# Patient Record
Sex: MALE | Race: WHITE | Employment: OTHER | URBAN - METROPOLITAN AREA
[De-identification: names, ages, dates, MRNs, and addresses within clinical notes are randomized per-mention and may not be internally consistent; named-entity substitution may affect disease eponyms.]

---

## 2017-12-29 ENCOUNTER — HOSPITAL ENCOUNTER (OUTPATIENT)
Dept: LAB | Age: 62
Discharge: HOME | End: 2017-12-29

## 2017-12-29 PROCEDURE — 88305 TISSUE EXAM BY PATHOLOGIST: CPT | Performed by: INTERNAL MEDICINE

## 2020-06-03 PROCEDURE — 88305 TISSUE EXAM BY PATHOLOGIST: CPT

## 2020-06-04 ENCOUNTER — HOSPITAL ENCOUNTER (OUTPATIENT)
Dept: LAB | Age: 65
Discharge: HOME | End: 2020-06-04

## 2021-08-18 ENCOUNTER — ANESTHESIA EVENT (OUTPATIENT)
Dept: ENDOSCOPY | Age: 66
End: 2021-08-18
Payer: COMMERCIAL

## 2021-08-18 RX ORDER — SODIUM CHLORIDE, SODIUM LACTATE, POTASSIUM CHLORIDE, CALCIUM CHLORIDE 600; 310; 30; 20 MG/100ML; MG/100ML; MG/100ML; MG/100ML
100 INJECTION, SOLUTION INTRAVENOUS CONTINUOUS
Status: CANCELLED | OUTPATIENT
Start: 2021-08-18

## 2021-08-19 ENCOUNTER — APPOINTMENT (OUTPATIENT)
Dept: GENERAL RADIOLOGY | Age: 66
End: 2021-08-19
Attending: INTERNAL MEDICINE
Payer: COMMERCIAL

## 2021-08-19 ENCOUNTER — ANESTHESIA (OUTPATIENT)
Dept: ENDOSCOPY | Age: 66
End: 2021-08-19
Payer: COMMERCIAL

## 2021-08-19 ENCOUNTER — HOSPITAL ENCOUNTER (OUTPATIENT)
Age: 66
Setting detail: OUTPATIENT SURGERY
Discharge: HOME | End: 2021-08-19
Attending: INTERNAL MEDICINE | Admitting: INTERNAL MEDICINE
Payer: COMMERCIAL

## 2021-08-19 VITALS
HEART RATE: 58 BPM | BODY MASS INDEX: 27.77 KG/M2 | TEMPERATURE: 98 F | DIASTOLIC BLOOD PRESSURE: 87 MMHG | OXYGEN SATURATION: 100 % | SYSTOLIC BLOOD PRESSURE: 121 MMHG | HEIGHT: 72 IN | WEIGHT: 205 LBS | RESPIRATION RATE: 16 BRPM

## 2021-08-19 PROCEDURE — 74011000250 HC RX REV CODE- 250: Performed by: NURSE ANESTHETIST, CERTIFIED REGISTERED

## 2021-08-19 PROCEDURE — 76040000025: Performed by: INTERNAL MEDICINE

## 2021-08-19 PROCEDURE — 2709999900 HC NON-CHARGEABLE SUPPLY: Performed by: INTERNAL MEDICINE

## 2021-08-19 PROCEDURE — 88305 TISSUE EXAM BY PATHOLOGIST: CPT

## 2021-08-19 PROCEDURE — 76060000032 HC ANESTHESIA 0.5 TO 1 HR: Performed by: INTERNAL MEDICINE

## 2021-08-19 PROCEDURE — 74011250636 HC RX REV CODE- 250/636: Performed by: NURSE ANESTHETIST, CERTIFIED REGISTERED

## 2021-08-19 PROCEDURE — 77030021593 HC FCPS BIOP ENDOSC BSC -A: Performed by: INTERNAL MEDICINE

## 2021-08-19 PROCEDURE — 74011250636 HC RX REV CODE- 250/636: Performed by: ANESTHESIOLOGY

## 2021-08-19 RX ORDER — SODIUM CHLORIDE, SODIUM LACTATE, POTASSIUM CHLORIDE, CALCIUM CHLORIDE 600; 310; 30; 20 MG/100ML; MG/100ML; MG/100ML; MG/100ML
1000 INJECTION, SOLUTION INTRAVENOUS CONTINUOUS
Status: DISCONTINUED | OUTPATIENT
Start: 2021-08-19 | End: 2021-08-19 | Stop reason: HOSPADM

## 2021-08-19 RX ORDER — PROPOFOL 10 MG/ML
INJECTION, EMULSION INTRAVENOUS
Status: DISCONTINUED | OUTPATIENT
Start: 2021-08-19 | End: 2021-08-19 | Stop reason: HOSPADM

## 2021-08-19 RX ORDER — LIDOCAINE HYDROCHLORIDE 20 MG/ML
INJECTION, SOLUTION EPIDURAL; INFILTRATION; INTRACAUDAL; PERINEURAL AS NEEDED
Status: DISCONTINUED | OUTPATIENT
Start: 2021-08-19 | End: 2021-08-19 | Stop reason: HOSPADM

## 2021-08-19 RX ORDER — PROPOFOL 10 MG/ML
INJECTION, EMULSION INTRAVENOUS AS NEEDED
Status: DISCONTINUED | OUTPATIENT
Start: 2021-08-19 | End: 2021-08-19 | Stop reason: HOSPADM

## 2021-08-19 RX ADMIN — SODIUM CHLORIDE, SODIUM LACTATE, POTASSIUM CHLORIDE, AND CALCIUM CHLORIDE 1000 ML: 600; 310; 30; 20 INJECTION, SOLUTION INTRAVENOUS at 11:08

## 2021-08-19 RX ADMIN — PROPOFOL 200 MCG/KG/MIN: 10 INJECTION, EMULSION INTRAVENOUS at 12:01

## 2021-08-19 RX ADMIN — LIDOCAINE HYDROCHLORIDE 100 MG: 20 INJECTION, SOLUTION EPIDURAL; INFILTRATION; INTRACAUDAL; PERINEURAL at 12:01

## 2021-08-19 RX ADMIN — PROPOFOL 80 MG: 10 INJECTION, EMULSION INTRAVENOUS at 12:01

## 2021-08-19 NOTE — ANESTHESIA POSTPROCEDURE EVALUATION
Procedure(s):  COLONOSCOPY/BMI 30  COLON BIOPSY. total IV anesthesia    Anesthesia Post Evaluation      Multimodal analgesia: multimodal analgesia used between 6 hours prior to anesthesia start to PACU discharge  Patient location during evaluation: bedside  Patient participation: complete - patient participated  Level of consciousness: awake and responsive to light touch  Pain management: adequate  Airway patency: patent  Anesthetic complications: no  Cardiovascular status: acceptable, hemodynamically stable, blood pressure returned to baseline and stable  Respiratory status: acceptable, unassisted, spontaneous ventilation and nonlabored ventilation  Hydration status: acceptable        INITIAL Post-op Vital signs:   Vitals Value Taken Time   BP 97/69 08/19/21 1240   Temp 36.7 °C (98 °F) 08/19/21 1240   Pulse 53 08/19/21 1252   Resp 12 08/19/21 1240   SpO2 97 % 08/19/21 1252   Vitals shown include unvalidated device data.

## 2021-08-19 NOTE — PROCEDURES
COLONOSCOPY    DATE of PROCEDURE: 8/19/2021    INDICATION:  1. Crohn's disease    POSTPROCEDURE DIAGNOSIS:  1. S/p ileocecetomy   2. Stenosis of surgical anastomosis  3. Diverticulosis of colon    MEDICATION: MAC. Further details per anesthesia note. INSTRUMENT: [id number]    PROCEDURE: After obtaining informed consent, the patient was placed in the left lateral position and sedated. The endoscope was advanced to the surgical anastomosis. On withdrawal, the colon was carefully visualized in a 360 degree fashion. The patient was taken to the recovery area in stable condition. Good bowel prep. FINDINGS:  Rectum: normal  Sigmoid: Mild diverticulosis. Otherwise normal exam.  Descending Colon: Mild diverticulosis. Otherwise normal exam.  Transverse Colon: Mild diverticulosis. Otherwise normal exam.  Ascending Colon: Patient is s/p ileocecectomy. Surgical anastomosis is stenotic to such a degree it can not be traversed with a pediatric colonoscope. It is not tight enough to justify dilation. Limited views of angel-terminal ileum are negative for ileitis. Prior moderate to severe colitis at anastomosis site appears to have resolved. Random colon biopsies      Estimated blood loss: 0-minimal   Specimens obtained during procedure:   1. Right colon biopsies  2. Transverse colon biopsies  3. Left colon biopsies      PLAN:  1. Follow-up pathology  2. Repeat exam based on pathology  3.  Follow-up in office in 3 months with repeat CBC and Pamela Cartwright MD

## 2021-08-19 NOTE — H&P
Gastroenterology Outpatient History and Physical    Patient: Edil Laboy    Physician: Ye Balderrama MD    Vital Signs: Blood pressure (!) 163/87, pulse 61, temperature 98.2 °F (36.8 °C), resp. rate 16, height 6' (1.829 m), weight 93 kg (205 lb), SpO2 98 %. Allergies:    Allergies   Allergen Reactions    Adalimumab Other (comments)     crohns like symptoms    Insect Venom Swelling     Insect bites causes rash, swelling and cellulitis    Ustekinumab Other (comments)     Can take Stelara- it just give him seasonal allergy symptoms       Chief Complaint: Crohn's disease    History of Present Illness: As Above    Justification for Procedure: As Above    History:  Past Medical History:   Diagnosis Date    Arrhythmia 2020    PVC's when had a PE no problem since    Arthritis     hands     Basal cell carcinoma 04/2011    right face    Basal cell carcinoma (BCC) of back     BPH (benign prostatic hyperplasia)     Crohn's colitis ([address]     History of kidney stones     PUD (peptic ulcer disease) 1988    [facility]monary embolism [facility] [address] [address] 2020    had PVC's with PE    Thromboembolus ([address] 02/2020    LE      Past Surgical History:   Procedure Laterality Date    HX APPENDECTOMY      HX COLONOSCOPY      HX ENDOSCOPY  10/14/2020    HX HERNIA REPAIR  2010    Incisional hernia repair with mesh midline above and below umbilicus    HX KNEE ARTHROSCOPY Right 2016    HX TURP  11/14/2020    ID ABDOMEN SURGERY PROC UNLISTED  1988     Lower portion of small bowel  resection      Social History     Socioeconomic History    Marital status:      Spouse name: Not on file    Number of children: Not on file    Years of education: Not on file    Highest education level: Not on file   Tobacco Use    Smoking status: Never Smoker    Smokeless tobacco: Never Used   Vaping Use    Vaping Use: Never used   Substance and Sexual Activity    Alcohol use: Yes     Comment: occasional drink     Drug use: Never     Social Determinants of Health     Financial Resource Strain:     Difficulty of Paying Living Expenses:    Food Insecurity:     Worried About Running Out of Food in the Last Year:     [address] in the Last Year:    Transportation Needs:     Lack of Transportation (Medical):  Lack of Transportation (Non-Medical):    Physical Activity:     Days of Exercise per Week:     Minutes of Exercise per Session:    Stress:     Feeling of Stress :    Social Connections:     Frequency of Communication with Friends and Family:     Frequency of Social Gatherings with Friends and Family:     Attends [employer] Services:     Active Member of Clubs or Organizations:     Attends Club or Organization Meetings:     Marital Status:     No family history on file. Medications:   Prior to Admission medications    Medication Sig Start Date End Date Taking? Authorizing Provider   omeprazole (PRILOSEC) 20 mg capsule Take 20 mg by mouth daily. Yes Provider, Historical   ustekinumab (ustekinaumab) 90 mg/mL injection 90 mg by SubCUTAneous route. Provider, Historical   apixaban (ELIQUIS PO) Take 1.25 mg by mouth two (2) times a day. Provider, Historical   acetaminophen (Tylenol Extra Strength) 500 mg tablet Take  by mouth.     Provider, Historical       Physical Exam:         Heart: regular rate and rhythm, S1, S2 normal, no murmur, click, rub or gallop   Lungs: chest clear, no wheezing, rales, normal symmetric air entry, Heart exam - S1, S2 normal, no murmur, no gallop, rate regular   Abdominal: Bowel sounds are normal, Bowel sounds active, liver is not enlarged, spleen is not enlarged           Findings/Diagnosis: Crohn's disease    Colonosocpy        Signed:  Tesha Powell MD 8/19/2021

## 2021-08-19 NOTE — ANESTHESIA PREPROCEDURE EVALUATION
Relevant Problems   No relevant active problems       Anesthetic History   No history of anesthetic complications            Review of Systems / Medical History  Patient summary reviewed and pertinent labs reviewed    Pulmonary  Within defined limits                 Neuro/Psych   Within defined limits           Cardiovascular            Dysrhythmias       Exercise tolerance: >4 METS  Comments: H/o PE on [address]   GI/Hepatic/Renal           PUD     Endo/Other        Arthritis     Other Findings              Physical Exam    Airway  Mallampati: II  TM Distance: 4 - 6 cm  Neck ROM: normal range of motion   Mouth opening: Normal     Cardiovascular    Rhythm: regular  Rate: normal      Pertinent negatives: No murmur   Dental  No notable dental hx       Pulmonary  Breath sounds clear to auscultation               Abdominal         Other Findings            Anesthetic Plan    ASA: 2  Anesthesia type: total IV anesthesia          Induction: Intravenous  Anesthetic plan and risks discussed with: Patient

## 2021-08-19 NOTE — DISCHARGE INSTRUCTIONS
Gastrointestinal Colonoscopy/Flexible Sigmoidoscopy - Lower Exam Discharge Instructions  1. Call Dr. Moy Garrett at [phone] for any problems or questions. 2. Contact the doctors office for follow up appointment as directed  3. Medication may cause drowsiness for several hours, therefore:  · Do not drive or operate machinery for reminder of the day. · No alcohol today. · Do not make any important or legal decisions for 24 hours. · Do not sign any legal documents for 24 hours. 4. Ordinarily, you may resume regular diet and activity after exam unless otherwise specified by your physician. 5. Because of air put into your colon during exam, you may experience some abdominal distension, relieved by the passage of gas, for several hours. 6. Contact your physician if you have any of the following:  · Excessive amount of bleeding - large amount when having a bowel movement. Occasional specks of blood with bowel movement would not be unusual.  · Severe abdominal pain  · Fever or Chills  Any additional instructions:    1. Dr. Clint Ceron office will call with pathology results and follow up appointment   2.  Follow-up in office in 3 months with repeat CBC and CMP

## 2025-05-14 ENCOUNTER — OFFICE VISIT (OUTPATIENT)
Age: 70
End: 2025-05-14
Payer: COMMERCIAL

## 2025-05-14 VITALS
DIASTOLIC BLOOD PRESSURE: 90 MMHG | SYSTOLIC BLOOD PRESSURE: 138 MMHG | BODY MASS INDEX: 28.96 KG/M2 | HEIGHT: 72 IN | WEIGHT: 213.8 LBS | HEART RATE: 56 BPM

## 2025-05-14 DIAGNOSIS — I49.9 IRREGULAR HEART RATE: ICD-10-CM

## 2025-05-14 DIAGNOSIS — I25.119 CORONARY ARTERY DISEASE INVOLVING NATIVE CORONARY ARTERY OF NATIVE HEART WITH ANGINA PECTORIS: ICD-10-CM

## 2025-05-14 DIAGNOSIS — I47.29 NSVT (NONSUSTAINED VENTRICULAR TACHYCARDIA) (HCC): ICD-10-CM

## 2025-05-14 DIAGNOSIS — I20.89 ANGINA OF EFFORT: ICD-10-CM

## 2025-05-14 DIAGNOSIS — R07.89 CHEST DISCOMFORT: ICD-10-CM

## 2025-05-14 DIAGNOSIS — I25.119 ATHEROSCLEROSIS OF NATIVE CORONARY ARTERY OF NATIVE HEART WITH ANGINA PECTORIS: ICD-10-CM

## 2025-05-14 DIAGNOSIS — R07.89 CHEST DISCOMFORT: Primary | ICD-10-CM

## 2025-05-14 DIAGNOSIS — I71.21 ANEURYSM OF ASCENDING AORTA WITHOUT RUPTURE: ICD-10-CM

## 2025-05-14 DIAGNOSIS — I48.0 PAROXYSMAL ATRIAL FIBRILLATION (HCC): ICD-10-CM

## 2025-05-14 DIAGNOSIS — Z78.9 STATIN INTOLERANCE: ICD-10-CM

## 2025-05-14 LAB
ANION GAP SERPL CALC-SCNC: 9 MMOL/L (ref 7–16)
BASOPHILS # BLD: 0.07 K/UL (ref 0–0.2)
BASOPHILS NFR BLD: 1.1 % (ref 0–2)
BUN SERPL-MCNC: 12 MG/DL (ref 8–23)
CALCIUM SERPL-MCNC: 9.4 MG/DL (ref 8.8–10.2)
CHLORIDE SERPL-SCNC: 104 MMOL/L (ref 98–107)
CO2 SERPL-SCNC: 26 MMOL/L (ref 20–29)
CREAT SERPL-MCNC: 0.94 MG/DL (ref 0.8–1.3)
DIFFERENTIAL METHOD BLD: ABNORMAL
EOSINOPHIL # BLD: 0.1 K/UL (ref 0–0.8)
EOSINOPHIL NFR BLD: 1.5 % (ref 0.5–7.8)
ERYTHROCYTE [DISTWIDTH] IN BLOOD BY AUTOMATED COUNT: 13.8 % (ref 11.9–14.6)
GLUCOSE SERPL-MCNC: 82 MG/DL (ref 70–99)
HCT VFR BLD AUTO: 39.3 % (ref 41.1–50.3)
HGB BLD-MCNC: 12.9 G/DL (ref 13.6–17.2)
IMM GRANULOCYTES # BLD AUTO: 0.03 K/UL (ref 0–0.5)
IMM GRANULOCYTES NFR BLD AUTO: 0.5 % (ref 0–5)
LYMPHOCYTES # BLD: 1.45 K/UL (ref 0.5–4.6)
LYMPHOCYTES NFR BLD: 21.8 % (ref 13–44)
MAGNESIUM SERPL-MCNC: 2 MG/DL (ref 1.8–2.4)
MCH RBC QN AUTO: 28.1 PG (ref 26.1–32.9)
MCHC RBC AUTO-ENTMCNC: 32.8 G/DL (ref 31.4–35)
MCV RBC AUTO: 85.6 FL (ref 82–102)
MONOCYTES # BLD: 0.55 K/UL (ref 0.1–1.3)
MONOCYTES NFR BLD: 8.3 % (ref 4–12)
NEUTS SEG # BLD: 4.44 K/UL (ref 1.7–8.2)
NEUTS SEG NFR BLD: 66.8 % (ref 43–78)
NRBC # BLD: 0 K/UL (ref 0–0.2)
PLATELET # BLD AUTO: 243 K/UL (ref 150–450)
PMV BLD AUTO: 9.9 FL (ref 9.4–12.3)
POTASSIUM SERPL-SCNC: 4 MMOL/L (ref 3.5–5.1)
RBC # BLD AUTO: 4.59 M/UL (ref 4.23–5.6)
SODIUM SERPL-SCNC: 139 MMOL/L (ref 136–145)
TSH, 3RD GENERATION: 2.42 UIU/ML (ref 0.27–4.2)
WBC # BLD AUTO: 6.6 K/UL (ref 4.3–11.1)

## 2025-05-14 PROCEDURE — 93000 ELECTROCARDIOGRAM COMPLETE: CPT | Performed by: INTERNAL MEDICINE

## 2025-05-14 PROCEDURE — 99245 OFF/OP CONSLTJ NEW/EST HI 55: CPT | Performed by: INTERNAL MEDICINE

## 2025-05-14 RX ORDER — SODIUM CHLORIDE 0.9 % (FLUSH) 0.9 %
5-40 SYRINGE (ML) INJECTION PRN
OUTPATIENT
Start: 2025-05-14

## 2025-05-14 RX ORDER — FLECAINIDE ACETATE 100 MG/1
100 TABLET ORAL 2 TIMES DAILY
COMMUNITY
Start: 2025-04-18 | End: 2026-04-18

## 2025-05-14 RX ORDER — ASPIRIN 325 MG
325 TABLET ORAL ONCE
OUTPATIENT
Start: 2025-05-14 | End: 2025-05-14

## 2025-05-14 RX ORDER — LORAZEPAM 0.5 MG/1
0.5 TABLET ORAL
OUTPATIENT
Start: 2025-05-14

## 2025-05-14 RX ORDER — SODIUM CHLORIDE 0.9 % (FLUSH) 0.9 %
5-40 SYRINGE (ML) INJECTION EVERY 12 HOURS SCHEDULED
OUTPATIENT
Start: 2025-05-14

## 2025-05-14 RX ORDER — SODIUM CHLORIDE 9 MG/ML
INJECTION, SOLUTION INTRAVENOUS PRN
OUTPATIENT
Start: 2025-05-14

## 2025-05-14 RX ORDER — SODIUM CHLORIDE 9 MG/ML
INJECTION, SOLUTION INTRAVENOUS CONTINUOUS
OUTPATIENT
Start: 2025-05-14

## 2025-05-14 RX ORDER — ONDANSETRON 4 MG/1
TABLET, ORALLY DISINTEGRATING ORAL
COMMUNITY
Start: 2025-02-14

## 2025-05-14 RX ORDER — EVOLOCUMAB 140 MG/ML
140 INJECTION, SOLUTION SUBCUTANEOUS
COMMUNITY
Start: 2025-02-11

## 2025-05-14 NOTE — PROGRESS NOTES
disease involving native coronary artery of native heart with angina pectoris  -     Basic Metabolic Panel; Future  -     CBC with Auto Differential; Future  -     Magnesium; Future  -     TSH; Future    Aneurysm of ascending aorta without rupture    Statin intolerance    Paroxysmal atrial fibrillation (HCC)    Angina of effort    Other orders  -     Left Heart Cath / Coronary Angiography; Standing  -     Initiate PAT Protocol; Future  -     Full code; Standing  -     Verify informed consent; Standing  -     Verify pre-procedure history and physical completed; Standing  -     Procedure Consent; Standing  -     Diagnosis for Procedure; Standing  -     Vital signs (specify frequency); Standing  -     Diet NPO Exceptions are: Sips of Water with Meds; Standing  -     Verify modification of diabetic agents; Standing  -     Skin cleansing in pre-procedure area; Standing  -     Clip procedure site; Standing  -     Initiate Oxygen Therapy Protocol; Standing  -     CBC; Standing  -     Basic Metabolic Panel; Standing  -     Electrocardiogram, 12-lead ; Standing  -     0.9 % sodium chloride infusion  -     sodium chloride flush 0.9 % injection 5-40 mL  -     sodium chloride flush 0.9 % injection 5-40 mL  -     0.9 % sodium chloride infusion  -     aspirin tablet 325 mg  -     LORazepam (ATIVAN) tablet 0.5 mg  -     Left Heart Cath / Coronary Angiography          PLAN:     CAD:  NSVT - worse with exercise, more angina. Family hx as well.  Known CAD on CA score.  Angina worsening, plan on LHC.  More JUAREZ, EF normal on marko echo.        Cannot tolerate BB due to side effects.     STOP NOAC day before the C.      Plan for Left Heart Catheterization and possible Percutaneous Coronary Intervention (PCI) at [facility] due to Worsening angina within the last 1-2 mos as described in HPI.  Patient is at high risk with accelerating/new onset symptoms on at least 2 anti anginal medications if able to use such meds, therefore

## 2025-05-18 ENCOUNTER — PATIENT MESSAGE (OUTPATIENT)
Age: 70
End: 2025-05-18

## 2025-05-19 ENCOUNTER — TELEPHONE (OUTPATIENT)
Age: 70
End: 2025-05-19

## 2025-05-19 NOTE — TELEPHONE ENCOUNTER
Nurse called pt regarding the my chart message he sent. Pt said he feels fine this am, did not have to goto the ER. Pt said he is just going to stop his Flecainide due to increase in arrhythmias and is anneliese.for heart cath on wed. 5/21/25. Pt just wanted Aneudy to be aware. Nurse will send Dr Amado a message. Pt said ok.

## 2025-05-19 NOTE — TELEPHONE ENCOUNTER
I agree, if worsening, then stop the Flecainide for now.    Let's move forward with the cath Wed, I will be following along.    Please let me know if your symptoms worsen.   Thanks per Dr Amado

## 2025-05-19 NOTE — TELEPHONE ENCOUNTER
My Chart Message 9:20pm Sunday  I thought I was going crazy - the arrythmias have seemed to increase the last 3 days - I took a 1/2 dose for the first 5 days, and full dose the last 3 days.  After the second dose this evening, the arrythmias are coming several an hour rather than several a day.       I asked google if the drug can actually increase arrythmias, and this is what I found:     \"While flecainide can be effective for certain arrhythmias, its use in patients with structural heart disease, including blockages, carries a risk of proarrhythmia (inducing or worsening arrhythmias). \"     I will take no more.  It will be interesting to see what the cath shows on Wed.  Will stay up for awhile and see if the arrythmias settle down.  If it gets appreciably worse, or I have any other symptoms, will head to the ER.

## 2025-05-20 NOTE — PROGRESS NOTES
Patient pre-assessment complete for [facility] scheduled for 1400, arrival time 1130. Patient verified using . Patient instructed to bring a list of all home medications on the day of procedure. NPO status reinforced. Patient informed to take a full dose aspirin 325mg  or 81 mg x 4 on the day of procedure. Patient instructed to HOLD eliquis. Instructed they can take all other medications excluding vitamins & supplements. Patient verbalizes understanding of all instructions & denies any questions at this time.

## 2025-05-21 ENCOUNTER — HOSPITAL ENCOUNTER (OUTPATIENT)
Age: 70
Setting detail: OUTPATIENT SURGERY
Discharge: HOME | End: 2025-05-21
Attending: INTERNAL MEDICINE | Admitting: INTERNAL MEDICINE
Payer: COMMERCIAL

## 2025-05-21 VITALS
DIASTOLIC BLOOD PRESSURE: 92 MMHG | HEART RATE: 47 BPM | WEIGHT: 208 LBS | TEMPERATURE: 98 F | SYSTOLIC BLOOD PRESSURE: 162 MMHG | HEIGHT: 72 IN | RESPIRATION RATE: 21 BRPM | OXYGEN SATURATION: 98 % | BODY MASS INDEX: 28.17 KG/M2

## 2025-05-21 DIAGNOSIS — I25.119 ATHEROSCLEROSIS OF NATIVE CORONARY ARTERY OF NATIVE HEART WITH ANGINA PECTORIS: ICD-10-CM

## 2025-05-21 LAB
ECHO BSA: 2.19 M2
EKG ATRIAL RATE: 51 BPM
EKG DIAGNOSIS: NORMAL
EKG P AXIS: 51 DEGREES
EKG P-R INTERVAL: 168 MS
EKG Q-T INTERVAL: 440 MS
EKG QRS DURATION: 80 MS
EKG QTC CALCULATION (BAZETT): 405 MS
EKG R AXIS: 36 DEGREES
EKG T AXIS: 40 DEGREES
EKG VENTRICULAR RATE: 51 BPM

## 2025-05-21 PROCEDURE — 2580000003 HC RX 258: Performed by: INTERNAL MEDICINE

## 2025-05-21 PROCEDURE — 6370000000 HC RX 637 (ALT 250 FOR IP): Performed by: INTERNAL MEDICINE

## 2025-05-21 PROCEDURE — 93005 ELECTROCARDIOGRAM TRACING: CPT | Performed by: INTERNAL MEDICINE

## 2025-05-21 PROCEDURE — C1894 INTRO/SHEATH, NON-LASER: HCPCS | Performed by: INTERNAL MEDICINE

## 2025-05-21 PROCEDURE — 2709999900 HC NON-CHARGEABLE SUPPLY: Performed by: INTERNAL MEDICINE

## 2025-05-21 PROCEDURE — C1769 GUIDE WIRE: HCPCS | Performed by: INTERNAL MEDICINE

## 2025-05-21 PROCEDURE — 6360000004 HC RX CONTRAST MEDICATION: Performed by: INTERNAL MEDICINE

## 2025-05-21 PROCEDURE — 93458 L HRT ARTERY/VENTRICLE ANGIO: CPT | Performed by: INTERNAL MEDICINE

## 2025-05-21 PROCEDURE — 99152 MOD SED SAME PHYS/QHP 5/>YRS: CPT | Performed by: INTERNAL MEDICINE

## 2025-05-21 PROCEDURE — 2500000003 HC RX 250 WO HCPCS: Performed by: INTERNAL MEDICINE

## 2025-05-21 PROCEDURE — 6360000002 HC RX W HCPCS: Performed by: INTERNAL MEDICINE

## 2025-05-21 PROCEDURE — 93010 ELECTROCARDIOGRAM REPORT: CPT | Performed by: INTERNAL MEDICINE

## 2025-05-21 RX ORDER — SODIUM CHLORIDE 9 MG/ML
INJECTION, SOLUTION INTRAVENOUS PRN
OUTPATIENT
Start: 2025-05-21

## 2025-05-21 RX ORDER — LIDOCAINE HYDROCHLORIDE 10 MG/ML
INJECTION, SOLUTION INFILTRATION; PERINEURAL PRN
Status: DISCONTINUED | OUTPATIENT
Start: 2025-05-21 | End: 2025-05-21 | Stop reason: HOSPADM

## 2025-05-21 RX ORDER — MIDAZOLAM HYDROCHLORIDE 1 MG/ML
INJECTION, SOLUTION INTRAMUSCULAR; INTRAVENOUS PRN
Status: DISCONTINUED | OUTPATIENT
Start: 2025-05-21 | End: 2025-05-21 | Stop reason: HOSPADM

## 2025-05-21 RX ORDER — SODIUM CHLORIDE 9 MG/ML
INJECTION, SOLUTION INTRAVENOUS CONTINUOUS
Status: DISCONTINUED | OUTPATIENT
Start: 2025-05-21 | End: 2025-05-21 | Stop reason: HOSPADM

## 2025-05-21 RX ORDER — IOPAMIDOL 755 MG/ML
INJECTION, SOLUTION INTRAVASCULAR PRN
Status: DISCONTINUED | OUTPATIENT
Start: 2025-05-21 | End: 2025-05-21 | Stop reason: HOSPADM

## 2025-05-21 RX ORDER — SODIUM CHLORIDE 0.9 % (FLUSH) 0.9 %
5-40 SYRINGE (ML) INJECTION EVERY 12 HOURS SCHEDULED
OUTPATIENT
Start: 2025-05-21

## 2025-05-21 RX ORDER — ACETAMINOPHEN 325 MG/1
650 TABLET ORAL EVERY 4 HOURS PRN
OUTPATIENT
Start: 2025-05-21

## 2025-05-21 RX ORDER — SODIUM CHLORIDE 0.9 % (FLUSH) 0.9 %
5-40 SYRINGE (ML) INJECTION PRN
OUTPATIENT
Start: 2025-05-21

## 2025-05-21 RX ORDER — HEPARIN SODIUM 200 [USP'U]/100ML
INJECTION, SOLUTION INTRAVENOUS CONTINUOUS PRN
Status: DISCONTINUED | OUTPATIENT
Start: 2025-05-21 | End: 2025-05-21 | Stop reason: HOSPADM

## 2025-05-21 RX ORDER — ACETAMINOPHEN 325 MG/1
650 TABLET ORAL ONCE
Status: COMPLETED | OUTPATIENT
Start: 2025-05-21 | End: 2025-05-21

## 2025-05-21 RX ORDER — ASPIRIN 81 MG/1
324 TABLET, CHEWABLE ORAL DAILY
Status: DISCONTINUED | OUTPATIENT
Start: 2025-05-21 | End: 2025-05-21 | Stop reason: HOSPADM

## 2025-05-21 RX ADMIN — SODIUM CHLORIDE: 9 INJECTION, SOLUTION INTRAVENOUS at 12:33

## 2025-05-21 RX ADMIN — ACETAMINOPHEN 650 MG: 325 TABLET ORAL at 18:22

## 2025-05-21 ASSESSMENT — PAIN SCALES - GENERAL: PAINLEVEL_OUTOF10: 0

## 2025-05-21 NOTE — PROGRESS NOTES
Report received from  Cath Lab RN. Procedural finding communicated. Intra procedural medication administration reviewed. Progression of care discussed.    Patient received into CPRU room 1, Post C    Access site without bleeding or swelling. Right wrist    Patient instructed to limit movement of right upper extremity.    Routine post procedural vital signs & site assessment initiated.

## 2025-05-21 NOTE — PROGRESS NOTES
Patient received to CPRU room # 16  Ambulatory from [facility]. Patient scheduled for C today with Dr Heart. Procedure reviewed & questions answered, voiced good understanding consent obtained & placed on chart. All medications and medical history reviewed. Will prep patient per orders. Patient & family updated on plan of care.      The patient has a fraility score of 3-MANAGING WELL, based on ambulation. Patient took Aspirin 324 mg today at 0930 prior to arrival.

## 2025-05-21 NOTE — DISCHARGE INSTRUCTIONS
HEART CATHETERIZATION/ANGIOGRAPHY DISCHARGE INSTRUCTIONS    Check puncture site frequently for swelling or bleeding. If there is any bleeding, apply pressure over the area with a clean towel or washcloth and call 911. Notify your doctor for any redness, swelling, drainage, or oozing from the puncture site. Notify your doctor for any fever or chills.  If the extremity becomes cold, numb, or painful call [facility] at [phone].  Activity should be limited for the next 48 hours. No heavy lifting, pushing, pulling  or strenuous activity for 48 hours. No heavy lifting (anything over 10 pounds) for 3 days.  You may resume your usual diet. Drink more fluids than usual.  Have a responsible person drive you home and stay with you for at least 24 hours after your heart catheterization/angiography.  You may remove bandage from your right wrist in 24 hours. You may shower in 24 hours. No tub baths, hot tubs, or swimming for 1 week. Do not place any lotions, creams, powders, or ointments over puncture site for 1 week. You may place a clean band-aid over the puncture site each day for 5 days. Change daily.        Sedation for a Medical Procedure: Care Instructions     You were given a sedative medication during your visit. While many of the effects will have worn   off before you leave; you may continue to feel some effects for several hours.      Common side effects from sedation include:  Feeling sleepy. (Your doctors and nurses will make sure you are not too sleepy to go home.)  Nausea and vomiting. This usually does not last long.  Feeling tired.     How can you care for yourself at home?  Activity    Don't do anything for 24 hours that requires attention to detail. It takes time for the medicine effects to completely wear off.     Do not make important legal decisions for 24 hours.     Do not sign any legal documents for 24 hours.     Do not drink alcohol today     For your safety, you should not drive or operate

## 2025-05-21 NOTE — PROGRESS NOTES
[facility] w/ Dr. Heart  No interventions  R radial access  TR band to R radial @ 12mL  No s/sxs of bleeding or hematoma to R radial access site    Heparin 5000 units  Versed 2 mg  Fentanyl 25 mcg  Report called to ANGELIA Rey in CPRU

## 2025-05-22 ENCOUNTER — TELEPHONE (OUTPATIENT)
Age: 70
End: 2025-05-22

## 2025-05-22 NOTE — TELEPHONE ENCOUNTER
Called and left voicemail to schedule EP consult per Dr. Amado and Dr. Maciel.     Pre-booked appointment. Need to confirm.

## 2025-05-27 ENCOUNTER — INITIAL CONSULT (OUTPATIENT)
Age: 70
End: 2025-05-27
Payer: COMMERCIAL

## 2025-05-27 VITALS
WEIGHT: 209 LBS | HEART RATE: 56 BPM | BODY MASS INDEX: 28.31 KG/M2 | HEIGHT: 72 IN | DIASTOLIC BLOOD PRESSURE: 90 MMHG | SYSTOLIC BLOOD PRESSURE: 146 MMHG

## 2025-05-27 DIAGNOSIS — I25.119 CORONARY ARTERY DISEASE INVOLVING NATIVE CORONARY ARTERY OF NATIVE HEART WITH ANGINA PECTORIS: ICD-10-CM

## 2025-05-27 DIAGNOSIS — K50.10 CROHN'S DISEASE OF COLON WITHOUT COMPLICATION (HCC): ICD-10-CM

## 2025-05-27 DIAGNOSIS — I48.0 PAROXYSMAL ATRIAL FIBRILLATION (HCC): Primary | ICD-10-CM

## 2025-05-27 DIAGNOSIS — I20.89 ANGINA OF EFFORT: ICD-10-CM

## 2025-05-27 DIAGNOSIS — I25.119 ATHEROSCLEROSIS OF NATIVE CORONARY ARTERY OF NATIVE HEART WITH ANGINA PECTORIS: ICD-10-CM

## 2025-05-27 DIAGNOSIS — I71.21 ANEURYSM OF ASCENDING AORTA WITHOUT RUPTURE: ICD-10-CM

## 2025-05-27 DIAGNOSIS — I47.29 NSVT (NONSUSTAINED VENTRICULAR TACHYCARDIA) (HCC): ICD-10-CM

## 2025-05-27 PROCEDURE — 99204 OFFICE O/P NEW MOD 45 MIN: CPT | Performed by: INTERNAL MEDICINE

## 2025-05-27 PROCEDURE — 93000 ELECTROCARDIOGRAM COMPLETE: CPT | Performed by: INTERNAL MEDICINE

## 2025-05-27 PROCEDURE — 1123F ACP DISCUSS/DSCN MKR DOCD: CPT | Performed by: INTERNAL MEDICINE

## 2025-05-27 RX ORDER — OMEPRAZOLE 40 MG/1
CAPSULE, DELAYED RELEASE ORAL
COMMUNITY
Start: 2025-03-21

## 2025-05-27 RX ORDER — DILTIAZEM HYDROCHLORIDE 120 MG/1
120 CAPSULE, COATED, EXTENDED RELEASE ORAL EVERY EVENING
Qty: 30 CAPSULE | Refills: 5 | Status: SHIPPED | OUTPATIENT
Start: 2025-05-27

## 2025-05-27 ASSESSMENT — ENCOUNTER SYMPTOMS
RESPIRATORY NEGATIVE: 1
EYES NEGATIVE: 1
GASTROINTESTINAL NEGATIVE: 1
ALLERGIC/IMMUNOLOGIC NEGATIVE: 1

## 2025-05-27 NOTE — PROGRESS NOTES
hyperplasia)     CAD (coronary artery disease)     Crohn's colitis (HCC)     History of kidney stones     Hyperlipidemia     PUD (peptic ulcer disease) 1988    Pulmonary embolism (HCC) 2020    had PVC's with PE    Thromboembolus (HCC) 02/2020    LE     Past Surgical History:   Procedure Laterality Date    APPENDECTOMY      CARDIAC PROCEDURE N/A 5/21/2025    Left heart cath / coronary angiography performed by Tejas Heart MD at [facility] CARDIAC CATH LAB    COLONOSCOPY N/A 8/19/2021    COLONOSCOPY/BMI 30 performed by Akhil Gibson MD at [facility] ENDOSCOPY    COLONOSCOPY      HERNIA REPAIR  2010    Incisional hernia repair with mesh midline above and below umbilicus    KNEE ARTHROSCOPY Right 2016    OK UNLISTED PROCEDURE ABDOMEN PERITONEUM & OMENTUM  1988     Lower portion of small bowel  resection    TURP  11/14/2020    UPPER GASTROINTESTINAL ENDOSCOPY  10/14/2020     Family History   Problem Relation Age of Onset    Heart Disease Father     Heart Attack Father     Arrhythmia Sister     Arrhythmia Brother     Heart Disease Brother      Social History     Tobacco Use    Smoking status: Never    Smokeless tobacco: Never   Substance Use Topics    Alcohol use: Yes       ROS:  A comprehensive review of systems was performed with the pertinent positives and negatives as noted in the HPI in addition to:  Review of Systems   Constitutional: Negative.    HENT: Negative.     Eyes: Negative.    Respiratory: Negative.     Cardiovascular:  Positive for palpitations.   Gastrointestinal: Negative.    Endocrine: Negative.    Genitourinary: Negative.    Musculoskeletal: Negative.    Skin: Negative.    Allergic/Immunologic: Negative.    Neurological:  Positive for dizziness and light-headedness.   Hematological: Negative.    Psychiatric/Behavioral: Negative.     All other systems reviewed and are negative.    PHYSICAL EXAM:   BP (!) 146/90   Pulse 56   Ht 1.829 m (6')   Wt 94.8 kg (209 lb)   BMI 28.35 kg/m²      Wt Readings from Last

## 2025-05-31 ENCOUNTER — TELEPHONE (OUTPATIENT)
Age: 70
End: 2025-05-31

## 2025-05-31 NOTE — TELEPHONE ENCOUNTER
The patient calls concerned about diarrhea, near syncope.  These symptoms resolved, and he reports a history of Crohn's disease.  Discussed with patient this writer has nothing to add from a cardiac perspective.

## 2025-06-02 ENCOUNTER — TELEPHONE (OUTPATIENT)
Age: 70
End: 2025-06-02

## 2025-06-02 NOTE — TELEPHONE ENCOUNTER
I spoke w/pt.he said he started Diltiazem recently  and Friday night was nauseas and had diarrhea all night.Around 6am heart was out of rhythm and felt like he was gonna hit floor.Has feeling of impending doom.He called the answering service and it took 20min for answering service to  and another 15 to 20min for Foster to call him back.No more diarrhea Saturday. called pt.back and pt.said  made him feel like he was disturbing his Saturday.He said by this time all had settled down w/stomach.Has two dizzy spells Saturday and 2 spells Sunday of dizziness and out of rhythm.He wants to try and take the Diltiazem.He has been okay today. did tell him to cut back on his exercise which he has some.He does not feel the symptoms are related to GI issues.Has an appt.tomorrow w/ to see if he is okay to proceed w/colonoscopy?or should he wait due to continuing arrythmias?.Could the Diltiazem be causing GI issues?

## 2025-06-02 NOTE — TELEPHONE ENCOUNTER
----- Message from Dr. Álvaro Amado, DO sent at 6/2/2025  8:13 AM EDT -----  Please call him this AM.     Having diarrhea, not sure if Crohn's dz or reaction from the dilt that was started by Dr. Maciel.    Please see how he is feeling, anything needed?  May need to check with Dr. Maciel if having issues on new med.   Thanks

## 2025-06-02 NOTE — TELEPHONE ENCOUNTER
I called and and informed pt.of MD response and he v/u.I told him I will get back w/him next week after we discuss w/.

## 2025-06-02 NOTE — TELEPHONE ENCOUNTER
Please call him, friend of mine.   He is fine to proceed with any GI work-up for Crohn's that is needed, no cardiac restrictions.   I have reviewed things with Dr. Maciel and am hopeful the new med plan will help and work for him.   Hope the diarrhea is better?   See me as planned, he has my cell and can reach out to me personally as needed.   Thanks

## 2025-06-03 NOTE — TELEPHONE ENCOUNTER
Jose Maciel MD  You20 minutes ago (8:34 AM)       Tough situation. If he continues w GI symptoms, j would stop the dilt.   I spoke w/pt.told him MD response and he v/u.

## 2025-06-19 ENCOUNTER — OFFICE VISIT (OUTPATIENT)
Age: 70
End: 2025-06-19
Payer: COMMERCIAL

## 2025-06-19 VITALS
WEIGHT: 200.2 LBS | HEIGHT: 72 IN | BODY MASS INDEX: 27.12 KG/M2 | SYSTOLIC BLOOD PRESSURE: 122 MMHG | DIASTOLIC BLOOD PRESSURE: 82 MMHG | HEART RATE: 60 BPM

## 2025-06-19 DIAGNOSIS — I47.29 NSVT (NONSUSTAINED VENTRICULAR TACHYCARDIA) (HCC): Primary | ICD-10-CM

## 2025-06-19 DIAGNOSIS — I71.21 ANEURYSM OF ASCENDING AORTA WITHOUT RUPTURE: ICD-10-CM

## 2025-06-19 DIAGNOSIS — Z78.9 STATIN INTOLERANCE: ICD-10-CM

## 2025-06-19 DIAGNOSIS — I25.10 CORONARY ARTERY DISEASE INVOLVING NATIVE CORONARY ARTERY OF NATIVE HEART WITHOUT ANGINA PECTORIS: ICD-10-CM

## 2025-06-19 DIAGNOSIS — I48.0 PAROXYSMAL ATRIAL FIBRILLATION (HCC): ICD-10-CM

## 2025-06-19 PROBLEM — I20.89 ANGINA OF EFFORT: Status: RESOLVED | Noted: 2025-05-14 | Resolved: 2025-06-19

## 2025-06-19 PROBLEM — I25.119 ATHEROSCLEROSIS OF NATIVE CORONARY ARTERY OF NATIVE HEART WITH ANGINA PECTORIS: Status: RESOLVED | Noted: 2025-05-14 | Resolved: 2025-06-19

## 2025-06-19 PROCEDURE — 99214 OFFICE O/P EST MOD 30 MIN: CPT | Performed by: INTERNAL MEDICINE

## 2025-06-19 PROCEDURE — 1123F ACP DISCUSS/DSCN MKR DOCD: CPT | Performed by: INTERNAL MEDICINE

## 2025-06-19 NOTE — PROGRESS NOTES
2 [facility], [address] [place] [address]  PHONE: [phone]     25    NAME:  Chris Lopez  : 1955  MRN: [id number]       SUBJECTIVE:   Chris Lopez is a 70 y.o. male seen for a follow up visit regarding the following:     Chief Complaint   Patient presents with    Follow-Up from Hospital    Coronary Artery Disease     POST CATH        HPI:   Intolerance to statins (myalgias/arthralgias with atorvastatin, rosuvastatin), ezetimibe (GI distress)    NST:  PVCs, atrial fibrillation arrhythmias during recovery. Short burst of PAF in early recovery lasting less than 1 min   Recurrent DVT/PE now on low dose eliquis   CTA: Ascending aorta is mildly dilated measuring 4.4 cm   Dilated ascending aorta (4.4 cm) and aortic root (4.1 cm) on  echo  Crohn's disease   NSVT and pre-syncope documented at [facility].   [facility] 2025 [facility]: Mild to moderate non-obstructive disease         Done well since [facility], side effects on dilt. But doing ok on it now.  Less intense exercise again now.  Less symptoms.  Still some dizziness, palp now.    No new angina, CP.  HR lower now.   Patient denies recent history of orthopnea, PND, excessive dizziness and/or syncope.  Doing well on anticoagulation treatment as reviewed today, no bleeding issues or excessive bruising noted.        Diet better, down 16 pds.          Siblings with CAD, Afib  Father with SCD in 70s.            [facility] echo 3/2025:    The left ventricular systolic function is normal (55-65%).     Indeterminate left ventricular diastolic function.     The right ventricular size and systolic function are normal.     The left atrium is dilated.     Aortic valve calcification without stenosis.     No significant valvular abnormalities.     Bubble study was not performed.     Aortic root is dilated measuring 4.2-4.3 cm. Ascending aorta measures   3.8 cm.      CTA : Negative for abdominal aortic aneurysm.           Past Medical History,  no

## 2025-07-07 ENCOUNTER — OFFICE VISIT (OUTPATIENT)
Age: 70
End: 2025-07-07
Payer: COMMERCIAL

## 2025-07-07 VITALS
DIASTOLIC BLOOD PRESSURE: 82 MMHG | HEIGHT: 72 IN | BODY MASS INDEX: 26.28 KG/M2 | SYSTOLIC BLOOD PRESSURE: 134 MMHG | HEART RATE: 48 BPM | WEIGHT: 194 LBS

## 2025-07-07 DIAGNOSIS — I47.29 NSVT (NONSUSTAINED VENTRICULAR TACHYCARDIA) (HCC): Primary | ICD-10-CM

## 2025-07-07 PROCEDURE — 93000 ELECTROCARDIOGRAM COMPLETE: CPT | Performed by: INTERNAL MEDICINE

## 2025-07-07 PROCEDURE — 1123F ACP DISCUSS/DSCN MKR DOCD: CPT | Performed by: INTERNAL MEDICINE

## 2025-07-07 PROCEDURE — 99214 OFFICE O/P EST MOD 30 MIN: CPT | Performed by: INTERNAL MEDICINE

## 2025-07-07 RX ORDER — DILTIAZEM HYDROCHLORIDE 120 MG/1
120 CAPSULE, COATED, EXTENDED RELEASE ORAL 2 TIMES DAILY
Qty: 180 CAPSULE | Refills: 3 | Status: SHIPPED | OUTPATIENT
Start: 2025-07-07

## 2025-07-07 ASSESSMENT — ENCOUNTER SYMPTOMS
ALLERGIC/IMMUNOLOGIC NEGATIVE: 1
GASTROINTESTINAL NEGATIVE: 1
EYES NEGATIVE: 1
RESPIRATORY NEGATIVE: 1

## 2025-07-07 NOTE — TELEPHONE ENCOUNTER
MEDICATION REFILL REQUEST      Name of Medication:  Diltiazem  Dose:  120 mg  Frequency:  BID  Quantity:  180  Days' supply:  90 with 3 refills      Pharmacy Name/Location:  [phone]  Pt is out due to the change of dose in this medication ,Please call in a new rx asap

## 2025-07-07 NOTE — PROGRESS NOTES
Left Anterior Descending   The vessel was visualized by angiography. Size of vessel >=2.0 mm. There is moderate disease.   Mid LAD lesion, 40% stenosed.      Left Circumflex   The vessel was visualized by angiography. Size of vessel >=2.0 mm. The vessel is angiographically normal.      Right Coronary Artery   The vessel was visualized by angiography. Size of vessel >=2.0 mm. There is moderate disease. 6FR [id number]   Prox RCA lesion, 40% stenosed.        Stress Test: n/a     DEVICE INTERROGATION: n/a    Past Medical History, Past Surgical History, Family history, Social History, and Medications were all reviewed with the patient today and updated as necessary.     Current Outpatient Medications   Medication Sig Dispense Refill    omeprazole (PRILOSEC) 40 MG delayed release capsule Take 1 capsule by mouth daily      dilTIAZem (CARDIZEM CD) 120 MG extended release capsule Take 1 capsule by mouth every evening (Patient taking differently: Take 1 capsule by mouth 2 times daily) 30 capsule 5    apixaban (ELIQUIS) 2.5 MG TABS tablet Take 1 tablet by mouth 2 times daily      ascorbic acid (VITAMIN C) 1000 MG tablet Take 1 tablet by mouth daily      REPATHA SURECLICK SOAJ pen Inject 1 mL into the skin every 14 days      GLUCOSAMINE-CHONDROITIN PO Take by mouth      Cyanocobalamin (VITAMIN B-12 PO) Take by mouth      Probiotic Product (PROBIOTIC-10 PO) Take by mouth      Ferrous Sulfate (IRON PO) Take by mouth every other day      acetaminophen (TYLENOL) 500 MG tablet Take by mouth       No current facility-administered medications for this visit.     Allergies   Allergen Reactions    Adalimumab Other (See Comments)     crohns like symptoms    Insect Extract Swelling     Insect bites causes rash, swelling and cellulitis    Insect bites causes rash, swelling and cellulitis   \"ALL insects I will have a bad reaction\"    Ustekinumab (Murine) Other (See Comments)     Can take Stelara- it just give him seasonal allergy symptoms

## 2025-07-07 NOTE — TELEPHONE ENCOUNTER
Requested Prescriptions     Pending Prescriptions Disp Refills    dilTIAZem (CARDIZEM CD) 120 MG extended release capsule 180 capsule 3     Sig: Take 1 capsule by mouth 2 times daily    Verified rx in last OV date 7/7/25. Pharmacy confirmed. Erx as requested

## (undated) DEVICE — GLIDESHEATH SLENDER STAINLESS STEEL KIT: Brand: GLIDESHEATH SLENDER

## (undated) DEVICE — CATHETER DIAG 5FR L100CM LUMN ID0.047IN JL4 CRV 0 SIDE H

## (undated) DEVICE — SYR 5ML 1/5 GRAD LL NSAF LF --

## (undated) DEVICE — CONTAINER PREFIL FRMLN 40ML --

## (undated) DEVICE — CATHETER VASC 6 FR DIAG PGTL W/ SIDE H COR 110 CM LEN W/OUT

## (undated) DEVICE — CATHETER DIAG SM AD 6FR L100CM 0.038IN COR POLYUR JUDKINS L

## (undated) DEVICE — CANNULA NSL ORAL AD FOR CAPNOFLEX CO2 O2 AIRLFE

## (undated) DEVICE — FORCEPS BX L240CM JAW DIA2.8MM L CAP W/ NDL MIC MESH TOOTH

## (undated) DEVICE — KENDALL RADIOLUCENT FOAM MONITORING ELECTRODE RECTANGULAR SHAPE: Brand: KENDALL

## (undated) DEVICE — NDL PRT INJ NSAF BLNT 18GX1.5 --

## (undated) DEVICE — SYR 3ML LL TIP 1/10ML GRAD --

## (undated) DEVICE — GUIDEWIRE VASC L260CM DIA0.035IN RAD 3MM J TIP L7CM PTFE

## (undated) DEVICE — BAND COMPR L24CM REG CLR PLAS HEMSTAT EXT HK AND LOOP RETEN

## (undated) DEVICE — CATHETER DIAG AD 6FR L100CM 0.038IN COR POLYUR JUDKINS R 5

## (undated) DEVICE — CONNECTOR TBNG OD5-7MM O2 END DISP